# Patient Record
Sex: MALE | Race: WHITE | NOT HISPANIC OR LATINO | Employment: FULL TIME | URBAN - METROPOLITAN AREA
[De-identification: names, ages, dates, MRNs, and addresses within clinical notes are randomized per-mention and may not be internally consistent; named-entity substitution may affect disease eponyms.]

---

## 2022-03-28 ENCOUNTER — OFFICE VISIT (OUTPATIENT)
Dept: URGENT CARE | Age: 52
End: 2022-03-28
Payer: COMMERCIAL

## 2022-03-28 VITALS
OXYGEN SATURATION: 96 % | TEMPERATURE: 97 F | RESPIRATION RATE: 18 BRPM | HEIGHT: 74 IN | WEIGHT: 210 LBS | BODY MASS INDEX: 26.95 KG/M2 | HEART RATE: 76 BPM

## 2022-03-28 DIAGNOSIS — J02.9 SORE THROAT: Primary | ICD-10-CM

## 2022-03-28 LAB — S PYO AG THROAT QL: NEGATIVE

## 2022-03-28 PROCEDURE — 87880 STREP A ASSAY W/OPTIC: CPT

## 2022-03-28 PROCEDURE — 99213 OFFICE O/P EST LOW 20 MIN: CPT

## 2022-03-28 PROCEDURE — 87070 CULTURE OTHR SPECIMN AEROBIC: CPT

## 2022-03-28 NOTE — PATIENT INSTRUCTIONS
Viral Syndrome     Rapid strep negative  Sent for culture  Results in 24-48 hours  Trial daily antihistamine ie Allegra, Zyrtec, Claritin  Follow up with PCP in 3-5 days  Proceed to ER if symptoms worsen  AMBULATORY CARE:   Viral syndrome  is a term used for symptoms of an infection caused by a virus  Viruses are spread easily from person to person through the air and on shared items  Signs and symptoms  may start slowly or suddenly and last hours to days  They can be mild to severe and can change over days or hours  You may have any of the following:  · Fever and chills    · A runny or stuffy nose    · Cough, sore throat, or hoarseness    · Headache, or pain and pressure around your eyes    · Muscle aches and joint pain    · Shortness of breath or wheezing    · Abdominal pain, cramps, and diarrhea    · Nausea, vomiting, or loss of appetite    Call your local emergency number (911 in the [address] [place] [address],[address]) or have someone else call if:   · You have a seizure  · You cannot be woken  · You have chest pain or trouble breathing  Seek care immediately if:   · You have a stiff neck, a bad headache, and sensitivity to light  · You feel weak, dizzy, or confused  · You stop urinating or urinate a lot less than usual     · You cough up blood or thick yellow or green mucus  · You have severe abdominal pain or your abdomen is larger than usual     Call your doctor if:   · Your symptoms do not get better with treatment or get worse after 3 days  · You have a rash or ear pain  · You have burning when you urinate  · You have questions or concerns about your condition or care  Treatment for viral syndrome  may include medicines to manage your symptoms  Antibiotics are not given for a viral infection  You may  need any of the following:  · Acetaminophen  decreases pain and fever  It is available without a doctor's order  Ask how much to take and how often to take it  Follow directions   Read the labels of all other medicines you are using to see if they also contain acetaminophen, or ask your doctor or pharmacist  Acetaminophen can cause liver damage if not taken correctly  Do not use more than 4 grams (4,000 milligrams) total of acetaminophen in one day  · NSAIDs , such as ibuprofen, help decrease swelling, pain, and fever  NSAIDs can cause stomach bleeding or kidney problems in certain people  If you take blood thinner medicine, always ask your healthcare provider if NSAIDs are safe for you  Always read the medicine label and follow directions  · Cold medicine  helps decrease swelling, control a cough, and relieve chest or nasal congestion  · Saline nasal spray  helps decrease nasal congestion  Manage your symptoms:   · Drink liquids as directed to prevent dehydration  Ask how much liquid to drink each day and which liquids are best for you  Ask if you should drink an oral rehydration solution (ORS)  An ORS has the right amounts of water, salts, and sugar you need to replace body fluids  This may help prevent dehydration caused by vomiting or diarrhea  Do not drink liquids with caffeine  Liquids with caffeine can make dehydration worse  · Get plenty of rest to help your body heal   Take naps throughout the day  Ask your healthcare provider when you can return to work and your normal activities  · Use a cool mist humidifier to help you breathe easier  Ask your healthcare provider how to use a cool mist humidifier  · Eat honey or use cough drops for a sore throat  Cough drops are available without a doctor's order  Follow directions for taking cough drops  · Do not smoke or be close to anyone who is smoking  Nicotine and other chemicals in cigarettes and cigars can cause lung damage  Smoking can also delay healing  Ask your healthcare provider for information if you currently smoke and need help to quit  E-cigarettes or smokeless tobacco still contain nicotine   Talk to your healthcare provider before you use these products  Prevent the spread of germs:       · Wash your hands often  Wash your hands several times each day  Wash after you use the bathroom, change a child's diaper, and before you prepare or eat food  Use soap and water every time  Rub your soapy hands together, lacing your fingers  Wash the front and back of your hands, and in between your fingers  Use the fingers of one hand to scrub under the fingernails of the other hand  Wash for at least 20 seconds  Rinse with warm, running water for several seconds  Then dry your hands with a clean towel or paper towel  Use hand  that contains alcohol if soap and water are not available  Do not touch your eyes, nose, or mouth without washing your hands first          · Cover a sneeze or cough  Use a tissue that covers your mouth and nose  Throw the tissue away in a trash can right away  Use the bend of your arm if a tissue is not available  Wash your hands well with soap and water or use a hand   · Stay away from others while you are sick  Avoid crowds as much as possible  · Ask about vaccines you may need  Talk to your healthcare provider about your vaccine history  He or she will tell you which vaccines you need, and when to get them  ? Get the influenza (flu) vaccine as soon as recommended each year  The flu vaccine is available starting in September or October  Flu viruses change, so it is important to get a flu vaccine every year  ? Get the pneumonia vaccine if recommended  This vaccine is usually recommended every 5 years  Your provider will tell you when to get this vaccine, if needed  Follow up with your doctor as directed:  Write down your questions so you remember to ask them during your visits  © Copyright [employer] 2022 Information is for End User's use only and may not be sold, redistributed or otherwise used for commercial purposes   All illustrations and images included in CareNotes® are the copyrighted property of [employer]  or Leonora Ureña   The above information is an  only  It is not intended as medical advice for individual conditions or treatments  Talk to your doctor, nurse or pharmacist before following any medical regimen to see if it is safe and effective for you

## 2022-03-29 NOTE — PROGRESS NOTES
[facility]        NAME: Sophie Pires is a 46 y o  male  : 1970    MRN: [id number]  DATE: 2022  TIME: 8:25 AM    Assessment and Plan   Sore throat [J02 9]  1  Sore throat  Throat culture    POCT rapid strepA         Patient Instructions     Rapid strep negative  Sent for throat culture  Declined COVID testing  Recommend starting daily antihistamine  Follow up with PCP in 3-5 days  Proceed to ER if symptoms worsen  Chief Complaint     Chief Complaint   Patient presents with    Cold Like Symptoms     sore throat, runny nose, watery eyes, fatigue  started last night  Pt is COVID vaccinated          History of Present Illness     46 y o  M presents with complaints of watery eyes, rhinorrhea and sore throat x 1 day  Denies CP, SOB, N/V/D, fevers, chills, body aches  Not taking anything OTC  Denies sick contacts  No tobacco use  No recent travel  Denies seasonal allergies  No hx asthma/copd  Review of Systems   Review of Systems   Constitutional: Negative for chills, fatigue and fever  HENT: Positive for rhinorrhea and sore throat  Negative for congestion, ear discharge, ear pain, facial swelling, sinus pain and trouble swallowing  Eyes: Negative for photophobia and visual disturbance  Respiratory: Negative for cough, chest tightness, shortness of breath and wheezing  Cardiovascular: Negative for chest pain, palpitations and leg swelling  Gastrointestinal: Negative for abdominal pain, diarrhea, nausea and vomiting  Genitourinary: Negative for dysuria and flank pain  Musculoskeletal: Negative for arthralgias, back pain and myalgias  Skin: Negative for pallor and rash  Neurological: Negative for dizziness and headaches  Psychiatric/Behavioral: Negative for sleep disturbance  Current Medications     No current outpatient medications on file      Current Allergies     Allergies as of 2022    (No Known Allergies)            The following portions of the patient's history were reviewed and updated as appropriate: allergies, current medications, past family history, past medical history, past social history, past surgical history and problem list      No past medical history on file  No past surgical history on file  No family history on file  Medications have been verified  Objective   Pulse 76   Temp (!) 97 °F (36 1 °C)   Resp 18   Ht 6' 2" (1 88 m)   Wt 95 3 kg (210 lb)   SpO2 96%   BMI 26 96 kg/m²   No LMP for male patient  Physical Exam     Physical Exam  Vitals reviewed  Constitutional:       General: He is not in acute distress  Appearance: He is normal weight  He is not ill-appearing or toxic-appearing  HENT:      Head: Normocephalic  Right Ear: Tympanic membrane normal  No middle ear effusion  Tympanic membrane is not erythematous  Left Ear: Tympanic membrane normal   No middle ear effusion  Tympanic membrane is not erythematous  Nose: Nose normal  No congestion or rhinorrhea  Right Sinus: No maxillary sinus tenderness or frontal sinus tenderness  Left Sinus: No maxillary sinus tenderness or frontal sinus tenderness  Mouth/Throat:      Mouth: Mucous membranes are moist       Pharynx: Uvula midline  No pharyngeal swelling, oropharyngeal exudate, posterior oropharyngeal erythema or uvula swelling  Tonsils: No tonsillar exudate or tonsillar abscesses  Eyes:      Pupils: Pupils are equal, round, and reactive to light  Cardiovascular:      Rate and Rhythm: Normal rate and regular rhythm  Pulses: Normal pulses  Heart sounds: Normal heart sounds  Pulmonary:      Effort: Pulmonary effort is normal  No tachypnea or respiratory distress  Breath sounds: Normal breath sounds and air entry  No decreased breath sounds, wheezing, rhonchi or rales  Abdominal:      General: Bowel sounds are normal       Palpations: Abdomen is soft     Musculoskeletal:         General: Normal range of motion  Cervical back: Normal range of motion and neck supple  Lymphadenopathy:      Cervical: No cervical adenopathy  Skin:     General: Skin is warm and dry  Capillary Refill: Capillary refill takes less than 2 seconds  Neurological:      General: No focal deficit present  Mental Status: He is alert  Cranial Nerves: No cranial nerve deficit

## 2022-03-30 LAB — BACTERIA THROAT CULT: NORMAL
